# Patient Record
(demographics unavailable — no encounter records)

---

## 2024-10-07 NOTE — RESULTS/DATA
[] : results reviewed [de-identified] : REVIEWED FROM 10/3/24 [de-identified] : REVIEWED FROM 10/3/24 [de-identified] : REVIEWED FROM 10/3/24

## 2024-10-07 NOTE — RESULTS/DATA
[] : results reviewed [de-identified] : REVIEWED FROM 10/3/24 [de-identified] : REVIEWED FROM 10/3/24 [de-identified] : REVIEWED FROM 10/3/24

## 2024-10-07 NOTE — PLAN
,DirectAddress_Unknown
[FreeTextEntry1] : OPTIMIZED MEDICALLY FOR PROPOSED SURGICAL PROCEDURE PRE-OPERATIVE TESTING REVIEWED SEE CARDIOLOGY CLEARANCE FROM 9/19/24 - REVIEWED  EXCELLENT EXERCISE CAPACITY GI/DVT PROPHYLAXIS PER SURGERY AVOIDANCE OF NSAIDS, VITAMINS AND ASPIRIN CONTAINING PRODUCTS PRIOR TO SURGERY TO HOLD TRIAMTERENE/HCTZ MORNING OF PROCEDURE CALL WITH ANY QUESTIONS, CONCERNS OR CHANGES PRIOR TO PROCEDURE SUPPORT PROVIDED FOLLOW-UP POST-OPERATIVELY 
[FreeTextEntry1] : OPTIMIZED MEDICALLY FOR PROPOSED SURGICAL PROCEDURE PRE-OPERATIVE TESTING REVIEWED SEE CARDIOLOGY CLEARANCE FROM 9/19/24 - REVIEWED  EXCELLENT EXERCISE CAPACITY GI/DVT PROPHYLAXIS PER SURGERY AVOIDANCE OF NSAIDS, VITAMINS AND ASPIRIN CONTAINING PRODUCTS PRIOR TO SURGERY TO HOLD TRIAMTERENE/HCTZ MORNING OF PROCEDURE CALL WITH ANY QUESTIONS, CONCERNS OR CHANGES PRIOR TO PROCEDURE SUPPORT PROVIDED FOLLOW-UP POST-OPERATIVELY

## 2024-10-07 NOTE — HISTORY OF PRESENT ILLNESS
[No Pertinent Cardiac History] : no history of aortic stenosis, atrial fibrillation, coronary artery disease, recent myocardial infarction, or implantable device/pacemaker [No Pertinent Pulmonary History] : no history of asthma, COPD, sleep apnea, or smoking [No Adverse Anesthesia Reaction] : no adverse anesthesia reaction in self or family member [Chronic Anticoagulation] : no chronic anticoagulation [Chronic Kidney Disease] : no chronic kidney disease [Diabetes] : no diabetes [(Patient denies any chest pain, claudication, dyspnea on exertion, orthopnea, palpitations or syncope)] : Patient denies any chest pain, claudication, dyspnea on exertion, orthopnea, palpitations or syncope [FreeTextEntry1] : EXCISION LEFT LOWER LIP CANCER WITH LOCAL FLAP CLOSURE [FreeTextEntry2] : OCTOBER 10, 2024 [FreeTextEntry3] : DR. CLOUD [FreeTextEntry4] : MS. YE IS A PLEASANT 70 YO PRESENTING FOR MEDICAL CLEARANCE FOR UPCOMING LIP SURGERY.  NOTICED A VERY SMALL SPOT LOWER LOWER LIP.  SAW DERMATOLOGY.  INIITALLY HAD AREA FROZEN.  CAME BACK AND THEN HAD IT BIOPSIED.  BIOPSY WAS INCONCLUSIVE.  SWITCHED TO ANOTHER DERMATOLOGIST AND HAD A DEEPER BIOPSY PERFORMED (DR. LOCKETT).  REFERRED TO SURGERY.  ABOVE PROCEDURE PLANNED.  HAD CT BUT DUE TO DENTAL WORK UNABLE TO HAVE CLEAR IMAGING.  AS SUCH, IS GOING TO ALSO HAVE LYMPH NODES BIOPSIED.  FEELING WELL TODAY.  NO HISTORY OF MI, STROKE OR SEIZURE.  NO PERSONAL OR FAMILY HISTORY OF BLOOD OR CLOTTING DISORDERS.  DENIES EASY BLEEDING OR BRUISING.  IS ABLE TO WALK MULTIPLE BLOCKS AND GO UP MULTIPLE FLIGHTS OF STAIRS WITHOUT DIFFICULTY.   HAS HAD NO HEADACHE, DIZZINESS, CHEST PAIN, SHORTNESS OF BREATH, GI OR URINARY COMPLAINTS.  NO OTHER COMPLAINTS TODAY. SAW CARDIOLOGY SEPTEMBER 2024.  INITIALLY STOPPED TRIAMTERENE/HCTZ BUT WAS RESTARTED DUE TO SWELLING.  ALSO ON LISINOPRIL.  HSITORY OF H YPERTENSION, HYPERLIPIDEMIA, RHEUMATOID ARTHRITIS AND HYPOTHYROIDISM. [FreeTextEntry7] : CARDIOLOGY SEEN SEPTEMBER 2024, HAD CLEARANCE

## 2024-12-26 NOTE — DISCUSSION/SUMMARY
[FreeTextEntry1] : IMP   Mild Positional JESSICA Moderate in REM sleep  Plan  Weight loss Avoid supine sleep Oral appliance referral 3-month FU

## 2024-12-26 NOTE — PHYSICAL EXAM
[No Acute Distress] : no acute distress [Elongated Uvula] : elongated uvula [Enlarged Base of the Tongue] : enlarged base of the tongue [Retrognathia] : retrognathia [II] : Mallampati Class: II [Normal Appearance] : normal appearance [No Neck Mass] : no neck mass [Normal Rate/Rhythm] : normal rate/rhythm [Normal S1, S2] : normal s1, s2 [No Resp Distress] : no resp distress [Clear to Auscultation Bilaterally] : clear to auscultation bilaterally [No Clubbing] : no clubbing [No Cyanosis] : no cyanosis [No Edema] : no edema [TextBox_11] : High arched palate

## 2024-12-26 NOTE — HISTORY OF PRESENT ILLNESS
[TextBox_4] : 12/26/24  69F RA, HTN, Hypothyroidism, GERD, pnd Fatigue Marked claustrophobia  Couldn't use CPAP 
Home alone

## 2024-12-26 NOTE — CONSULT LETTER
[Dear  ___] : Dear  [unfilled], [Consult Letter:] : I had the pleasure of evaluating your patient, [unfilled]. [Please see my note below.] : Please see my note below. [Consult Closing:] : Thank you very much for allowing me to participate in the care of this patient.  If you have any questions, please do not hesitate to contact me. [Sincerely,] : Sincerely, [DrAracelis  ___] : Dr. WU